# Patient Record
Sex: FEMALE | Race: WHITE | URBAN - METROPOLITAN AREA
[De-identification: names, ages, dates, MRNs, and addresses within clinical notes are randomized per-mention and may not be internally consistent; named-entity substitution may affect disease eponyms.]

---

## 2023-04-04 ENCOUNTER — HOSPITAL ENCOUNTER (EMERGENCY)
Facility: HOSPITAL | Age: 48
Discharge: HOME | End: 2023-04-04
Attending: EMERGENCY MEDICINE
Payer: COMMERCIAL

## 2023-04-04 VITALS
HEART RATE: 84 BPM | RESPIRATION RATE: 18 BRPM | DIASTOLIC BLOOD PRESSURE: 77 MMHG | SYSTOLIC BLOOD PRESSURE: 156 MMHG | TEMPERATURE: 98 F | OXYGEN SATURATION: 99 %

## 2023-04-04 DIAGNOSIS — T78.40XA ALLERGIC REACTION, INITIAL ENCOUNTER: Primary | ICD-10-CM

## 2023-04-04 PROCEDURE — 99284 EMERGENCY DEPT VISIT MOD MDM: CPT

## 2023-04-04 PROCEDURE — 96376 TX/PRO/DX INJ SAME DRUG ADON: CPT

## 2023-04-04 PROCEDURE — 99285 EMERGENCY DEPT VISIT HI MDM: CPT

## 2023-04-04 PROCEDURE — 96374 THER/PROPH/DIAG INJ IV PUSH: CPT

## 2023-04-04 PROCEDURE — 96361 HYDRATE IV INFUSION ADD-ON: CPT

## 2023-04-04 PROCEDURE — S0028 INJECTION, FAMOTIDINE, 20 MG: HCPCS | Performed by: EMERGENCY MEDICINE

## 2023-04-04 PROCEDURE — 96375 TX/PRO/DX INJ NEW DRUG ADDON: CPT

## 2023-04-04 RX ORDER — DIPHENHYDRAMINE HYDROCHLORIDE 50 MG/ML
50 INJECTION INTRAMUSCULAR; INTRAVENOUS ONCE
Status: DISCONTINUED | OUTPATIENT
Start: 2023-04-04 | End: 2023-04-04

## 2023-04-04 RX ORDER — DIPHENHYDRAMINE HYDROCHLORIDE 50 MG/ML
50 INJECTION INTRAMUSCULAR; INTRAVENOUS ONCE
Status: COMPLETED | OUTPATIENT
Start: 2023-04-04 | End: 2023-04-04

## 2023-04-04 RX ORDER — METHYLPREDNISOLONE SODIUM SUCCINATE 125 MG/2ML
125 INJECTION, POWDER, LYOPHILIZED, FOR SOLUTION INTRAMUSCULAR; INTRAVENOUS ONCE
Status: COMPLETED | OUTPATIENT
Start: 2023-04-04 | End: 2023-04-04

## 2023-04-04 RX ORDER — DIPHENHYDRAMINE HYDROCHLORIDE 50 MG/ML
25 INJECTION INTRAMUSCULAR; INTRAVENOUS ONCE
Status: COMPLETED | OUTPATIENT
Start: 2023-04-04 | End: 2023-04-04

## 2023-04-04 RX ORDER — LORAZEPAM 2 MG/ML
1 INJECTION INTRAMUSCULAR ONCE
Status: COMPLETED | OUTPATIENT
Start: 2023-04-04 | End: 2023-04-04

## 2023-04-04 RX ORDER — METHYLPREDNISOLONE SODIUM SUCCINATE 125 MG/2ML
125 INJECTION, POWDER, LYOPHILIZED, FOR SOLUTION INTRAMUSCULAR; INTRAVENOUS ONCE
Status: DISCONTINUED | OUTPATIENT
Start: 2023-04-04 | End: 2023-04-04

## 2023-04-04 RX ORDER — FAMOTIDINE 10 MG/ML
20 INJECTION, SOLUTION INTRAVENOUS ONCE
Status: DISCONTINUED | OUTPATIENT
Start: 2023-04-04 | End: 2023-04-04

## 2023-04-04 RX ORDER — FAMOTIDINE 10 MG/ML
20 INJECTION, SOLUTION INTRAVENOUS ONCE
Status: COMPLETED | OUTPATIENT
Start: 2023-04-04 | End: 2023-04-04

## 2024-04-23 ENCOUNTER — HOSPITAL ENCOUNTER (EMERGENCY)
Facility: HOSPITAL | Age: 49
Discharge: HOME | End: 2024-04-23
Attending: EMERGENCY MEDICINE
Payer: COMMERCIAL

## 2024-04-23 VITALS
SYSTOLIC BLOOD PRESSURE: 142 MMHG | HEART RATE: 106 BPM | HEIGHT: 65 IN | OXYGEN SATURATION: 98 % | DIASTOLIC BLOOD PRESSURE: 73 MMHG | WEIGHT: 247 LBS | BODY MASS INDEX: 41.15 KG/M2 | RESPIRATION RATE: 19 BRPM

## 2024-04-23 DIAGNOSIS — T78.40XA ALLERGIC REACTION, INITIAL ENCOUNTER: Primary | ICD-10-CM

## 2024-04-23 PROCEDURE — 94640 AIRWAY INHALATION TREATMENT: CPT

## 2024-04-23 PROCEDURE — 99285 EMERGENCY DEPT VISIT HI MDM: CPT

## 2024-04-23 PROCEDURE — 99284 EMERGENCY DEPT VISIT MOD MDM: CPT

## 2024-04-23 PROCEDURE — S0028 INJECTION, FAMOTIDINE, 20 MG: HCPCS | Performed by: EMERGENCY MEDICINE

## 2024-04-23 PROCEDURE — 96376 TX/PRO/DX INJ SAME DRUG ADON: CPT

## 2024-04-23 PROCEDURE — 96374 THER/PROPH/DIAG INJ IV PUSH: CPT

## 2024-04-23 PROCEDURE — 96375 TX/PRO/DX INJ NEW DRUG ADDON: CPT

## 2024-04-23 RX ORDER — DIPHENHYDRAMINE HYDROCHLORIDE 50 MG/ML
50 INJECTION INTRAMUSCULAR; INTRAVENOUS ONCE
Status: COMPLETED | OUTPATIENT
Start: 2024-04-23 | End: 2024-04-23

## 2024-04-23 RX ORDER — METHYLPREDNISOLONE SODIUM SUCCINATE 125 MG/2ML
125 INJECTION, POWDER, LYOPHILIZED, FOR SOLUTION INTRAMUSCULAR; INTRAVENOUS ONCE
Status: COMPLETED | OUTPATIENT
Start: 2024-04-23 | End: 2024-04-23

## 2024-04-23 RX ORDER — ALBUTEROL SULFATE 2.5 MG/3ML
2.5 SOLUTION RESPIRATORY (INHALATION) ONCE
Status: COMPLETED | OUTPATIENT
Start: 2024-04-23 | End: 2024-04-23

## 2024-04-23 RX ORDER — KETOROLAC TROMETHAMINE 15 MG/ML
15 INJECTION, SOLUTION INTRAMUSCULAR; INTRAVENOUS ONCE
Status: COMPLETED | OUTPATIENT
Start: 2024-04-23 | End: 2024-04-23

## 2024-04-23 RX ORDER — FAMOTIDINE 10 MG/ML
20 INJECTION, SOLUTION INTRAVENOUS ONCE
Status: COMPLETED | OUTPATIENT
Start: 2024-04-23 | End: 2024-04-23

## 2024-04-23 RX ORDER — LORAZEPAM 2 MG/ML
1 INJECTION INTRAMUSCULAR ONCE
Status: COMPLETED | OUTPATIENT
Start: 2024-04-23 | End: 2024-04-23

## 2024-04-23 NOTE — ED PROVIDER NOTES
Patient Seen in: [facility] Emergency Department      History     Chief Complaint   Patient presents with    Allergic Rxn Allergies     From infusion facility     Stated Complaint: Allergic Reaction    Subjective:   HPI    48-year-old female with an immunodeficiency syndrome received an IVIG infusion today as she usually does every 3 weeks and developed an allergic response which prompted the ED visit.  She received a total of the following medications at the infusion center prior to coming by ambulance to: Solu-Medrol 125 mg, Benadryl 200 mg, Zofran 12 mg, acetaminophen 1000 mg, Pepcid 20 mg.  Currently she states that she is mildly short of breath and coughing and may benefit from a nebulizer treatment.  She is requesting additional rounds of the Benadryl, Solu-Medrol, Pepcid and IV Ativan.  She states that after that she will likely be able to go home.  No vomiting or diarrhea.  No fever.    Objective:   No pertinent past medical history.            No pertinent past surgical history.              No pertinent social history.            Review of Systems    Positive for stated complaint: Allergic Reaction  Other systems are as noted in HPI.  Constitutional and vital signs reviewed.      All other systems reviewed and negative except as noted above.    Physical Exam     ED Triage Vitals [04/23/24 1715]   BP (!) 164/74   Pulse (!) 136   Resp 18   Temp    Temp src    SpO2 99 %   O2 Device None (Room air)       Current:/73   Pulse 106   Resp 19   Ht 165.1 cm (5' 5\")   Wt 112 kg   SpO2 98%   BMI 41.10 kg/m²         Physical Exam    General appearance: This is a middle-aged female sitting on a gurney.  Vital signs were reviewed per nurses notes.  Monitor reveals a sinus tachycardia rate of 130s.  Initial blood pressure was 164/74.  Patient is afebrile.  Pulse oximetry is 99% on room air.  HEENT: Normocephalic/atraumatic.  Anicteric sclera.  Oral mucosa is moist.  Oropharynx is normal.  Neck: No  adenopathy or thyromegaly.  Lungs are clear to auscultation.  There is a persistent cough which is dry.  Heart exam: Normal S1-S2 without extra sounds or murmurs.  Regular rate and rhythm.  Abdomen is nontender.  Extremities are atraumatic.  Skin is dry without rashes or lesions.  Neuroexam: Awake, conversive and moving all 4 extremities well.    ED Course   Labs Reviewed - No data to display          Patient was kept on a cardiac monitor.  Intravenous access was maintained.  The patient was treated with Benadryl 50 mg, Solu-Medrol 125 mg, Pepcid 20 mg and Ativan 1 mg IV push.  And nebulizer treatment with albuterol and Atrovent was ordered.  The patient was placed on observation status.    After additional antihistamines and Ativan, the patient was noted to be sitting upright on a cart in no respiratory distress and feeling significantly symptomatically improved.  She desired discharge and was discharged to home.         MDM      #1.  Allergic reaction.  2.  Immunodeficiency syndrome.  Has had other allergic responses to IVIG infusion including 1 prior episode that necessitated ED visit.  After large doses of antihistamines, steroids and Ativan she is symptomatically improved and was discharged to home.                                   Medical Decision Making      Disposition and Plan     Clinical Impression:  1. Allergic reaction, initial encounter         Disposition:  Discharge  4/23/2024  9:16 pm    Follow-up:  Lucrecia Norman  [address] [place] [id number] [phone]    Call  As needed          Medications Prescribed:  Discharge Medication List as of 4/23/2024  9:21 PM

## 2024-04-23 NOTE — ED INITIAL ASSESSMENT (HPI)
Pt came from [place] where she gets [facility] therapy (60g); pt was given 200mg benadryl, 120 solumedrol, zofram 12mg, tylenol 1.5tab, pepcid \"single dose\"; JOAO; pt long hx of allergies; pt usually has 3 minor reaction but this time she was not recovering and the facility called EMS; was at ER last night in [place].

## 2024-04-24 NOTE — ED QUICK NOTES
Bedside patient report given to SERINA Lemons. Needs another round of her medication plan; MD going to assess and then order.

## 2024-05-14 ENCOUNTER — HOSPITAL ENCOUNTER (EMERGENCY)
Facility: HOSPITAL | Age: 49
Discharge: LEFT BEFORE DISCHARGE | End: 2024-05-14
Attending: EMERGENCY MEDICINE

## 2024-05-14 VITALS
RESPIRATION RATE: 15 BRPM | WEIGHT: 246.94 LBS | DIASTOLIC BLOOD PRESSURE: 72 MMHG | SYSTOLIC BLOOD PRESSURE: 151 MMHG | OXYGEN SATURATION: 96 % | HEART RATE: 119 BPM | BODY MASS INDEX: 41 KG/M2

## 2024-05-14 DIAGNOSIS — T78.40XA ALLERGIC REACTION, INITIAL ENCOUNTER: Primary | ICD-10-CM

## 2024-05-14 PROCEDURE — 96376 TX/PRO/DX INJ SAME DRUG ADON: CPT

## 2024-05-14 PROCEDURE — 99285 EMERGENCY DEPT VISIT HI MDM: CPT

## 2024-05-14 PROCEDURE — 96374 THER/PROPH/DIAG INJ IV PUSH: CPT

## 2024-05-14 PROCEDURE — 99284 EMERGENCY DEPT VISIT MOD MDM: CPT

## 2024-05-14 PROCEDURE — 96372 THER/PROPH/DIAG INJ SC/IM: CPT

## 2024-05-14 PROCEDURE — 96361 HYDRATE IV INFUSION ADD-ON: CPT

## 2024-05-14 PROCEDURE — S0028 INJECTION, FAMOTIDINE, 20 MG: HCPCS | Performed by: EMERGENCY MEDICINE

## 2024-05-14 PROCEDURE — 96375 TX/PRO/DX INJ NEW DRUG ADDON: CPT

## 2024-05-14 RX ORDER — ONDANSETRON 2 MG/ML
4 INJECTION INTRAMUSCULAR; INTRAVENOUS ONCE
Status: COMPLETED | OUTPATIENT
Start: 2024-05-14 | End: 2024-05-14

## 2024-05-14 RX ORDER — DIPHENHYDRAMINE HYDROCHLORIDE 50 MG/ML
50 INJECTION INTRAMUSCULAR; INTRAVENOUS ONCE
Status: DISCONTINUED | OUTPATIENT
Start: 2024-05-14 | End: 2024-05-14

## 2024-05-14 RX ORDER — LORAZEPAM 2 MG/ML
1 INJECTION INTRAMUSCULAR ONCE
Status: COMPLETED | OUTPATIENT
Start: 2024-05-14 | End: 2024-05-14

## 2024-05-14 RX ORDER — FAMOTIDINE 10 MG/ML
20 INJECTION, SOLUTION INTRAVENOUS ONCE
Status: COMPLETED | OUTPATIENT
Start: 2024-05-14 | End: 2024-05-14

## 2024-05-14 RX ORDER — METHYLPREDNISOLONE SODIUM SUCCINATE 125 MG/2ML
125 INJECTION, POWDER, LYOPHILIZED, FOR SOLUTION INTRAMUSCULAR; INTRAVENOUS ONCE
Status: COMPLETED | OUTPATIENT
Start: 2024-05-14 | End: 2024-05-14

## 2024-05-14 NOTE — ED PROVIDER NOTES
Patient Seen in: [facility] Emergency Department      History     Chief Complaint   Patient presents with    Allergic Rxn Allergies     Stated Complaint: allergic reaction to infusion    Subjective:   48-year-old female, receives every 3-week IVIG infusion, presents with possible allergic reaction.  States she was seen here last month for same.  States she required high doses of steroids, high-dose antihistamines, Ativan and Zofran and then goes away.  Standing up and today after infusion, sent here from infusion center with prescription from that physician there.  Upon arrival she is up and ambulatory.  She is coughing.  No signs of angioedema or anaphylaxis            Objective:   Past Medical History:    Allergies    Asthma (HCC)    Breast cancer (HCC)    Mast cell activation (HCC)              History reviewed. No pertinent surgical history.             Social History     Socioeconomic History    Marital status:    Tobacco Use    Smoking status: Never    Smokeless tobacco: Never   Vaping Use    Vaping status: Never Used   Substance and Sexual Activity    Alcohol use: Never    Drug use: Never              Review of Systems   Constitutional:  Negative for fever.   HENT:  Negative for sore throat.    Respiratory:  Positive for cough.    Gastrointestinal:  Positive for nausea. Negative for vomiting.   Skin:  Negative for rash.   All other systems reviewed and are negative.      Positive for stated complaint: allergic reaction to infusion  Other systems are as noted in HPI.  Constitutional and vital signs reviewed.      All other systems reviewed and negative except as noted above.    Physical Exam     ED Triage Vitals [05/14/24 1715]   BP (!) 153/92   Pulse (!) 136   Resp 24   Temp    Temp src    SpO2 99 %   O2 Device None (Room air)       Current Vitals:   Vital Signs  BP: 151/72  Pulse: 119  Resp: 15  MAP (mmHg): 92    Oxygen Therapy  SpO2: 96 %  O2 Device: None (Room air)            Physical  Exam  Vitals and nursing note reviewed.   Constitutional:       Appearance: She is not toxic-appearing or diaphoretic.   HENT:      Head: Normocephalic.      Nose: Nose normal.      Mouth/Throat:      Mouth: Mucous membranes are moist.      Pharynx: Oropharynx is clear. No oropharyngeal exudate or posterior oropharyngeal erythema.   Cardiovascular:      Rate and Rhythm: Regular rhythm. Tachycardia present.      Pulses: Normal pulses.      Heart sounds: Normal heart sounds.   Pulmonary:      Effort: Pulmonary effort is normal. No respiratory distress.      Breath sounds: Normal breath sounds. No stridor. No wheezing, rhonchi or rales.   Abdominal:      Palpations: Abdomen is soft.   Musculoskeletal:         General: Normal range of motion.      Cervical back: Normal range of motion and neck supple.   Skin:     General: Skin is warm and dry.      Findings: No erythema or rash.   Neurological:      General: No focal deficit present.      Mental Status: She is alert.               ED Course     Labs Reviewed   RAINBOW DRAW LAVENDER   RAINBOW DRAW LIGHT GREEN                      MDM        Differential diagnosis includes, but is not limited to, allergic reaction, somatoform disorder, angioedema, anaphylaxis, shock    Family at bedside helpful to provide information on the history of presenting illness    External chart review demonstrates outpatient visit with hematology oncology in March of this year    48-year-old female presents from infusion center for allergic reaction.  Patient has episodes of coughing fits.  At times she was intermittently wheezing.  States she requires high doses of steroids, high doses of antihistamines, high doses of benzodiazepines and antiemetics.  I did review her recent ER visit and some limited external records.  Did discuss risk benefits alternatives.  After several hours of observation here she stated she started get her cough and wheeze again.  She is not wheezing at this time on  clinical examination.  She still is tachycardic but had received an EpiPen throughout her stay at her request as well as more Ativan.  I did explain that we should admit her for observation, further evaluation as I am not comfortable giving her more steroids, more antihistamines, more Zofran, more Ativan etc.  She was not on board with that.  States she wants to go home.  States she has 3 EpiPen's at home that she carries at all times.  She would like a prescription for prednisone.  She has Benadryl and Pepcid at home.  I tried to convince her to stay to the best my ability and explained angioedema, anaphylaxis, allergic reaction etc.  She declines.  Family also try to get her to stay and she refuses.  She has capacity to make her own decisions.  AMA form was signed.  She understands welcome back at any time for reason for reassessment.      CRITICAL CARE:  A total of 80 minutes of critical care time (exclusive of billable procedures) was administered to manage the patient's respiratory instability and cardiovascular instability due to her persistent tachycardia, complaints of \"anaphylaxis.\"  Reaction to her infusion.  Monitoring telemetry.  Administration of intramuscular epinephrine, steroids, Benadryl, Pepcid, IV fluids, Ativan.  Frequent assessments, reassessments and evaluations.  Question with patient and family, chart review, documentation.  This involved direct patient intervention, complex decision making, and/or extensive discussions with the patient, family, and clinical staff.                             Medical Decision Making      Disposition and Plan     Clinical Impression:  1. Allergic reaction, initial encounter         Disposition:  [place]  5/14/2024  8:09 pm    Follow-up:  Lucrecia Norman  [address]  [facility] [id number] [phone]    Follow up      [facility]  [address] [facility] [address]  [phone]  Follow up  As  needed          Medications Prescribed:  Discharge Medication List as of 5/14/2024  8:10 PM        START taking these medications    Details   predniSONE 5 MG/ML Oral Conc Take 12 mL (60 mg total) by mouth daily for 4 days., Normal, Disp-48 mL, R-0

## 2024-05-14 NOTE — ED INITIAL ASSESSMENT (HPI)
Patient from [facility]. Sent for suspected allergic reaction to [facility] infusion for immunodeficiency. Received Tylenol 650mg, Zofran 4mg, Benadryl 100mg, Solu-Medrol 80mg and Pepcid 20mg within the last hour. Seen on Sunday for same. Refused PIV from EMS.

## 2024-05-14 NOTE — ED QUICK NOTES
Patient states she feels like her allergic reaction is coming back. MD aware. Patient placed back on monitor

## 2024-05-15 NOTE — ED QUICK NOTES
Patient states she feels like her \"reaction is coming back, I'm having a cough and a runny nose and I'm sweating so I need more benadryl and more ativan\". Pt maintaining airway and breathing easy, no other signs of allergic reaction noted at this time. MD aware.

## 2025-05-13 ENCOUNTER — HOSPITAL ENCOUNTER (EMERGENCY)
Facility: HOSPITAL | Age: 50
Discharge: HOME | End: 2025-05-13
Attending: EMERGENCY MEDICINE
Payer: COMMERCIAL

## 2025-05-13 VITALS
TEMPERATURE: 99 F | DIASTOLIC BLOOD PRESSURE: 83 MMHG | OXYGEN SATURATION: 100 % | SYSTOLIC BLOOD PRESSURE: 143 MMHG | HEART RATE: 79 BPM | RESPIRATION RATE: 15 BRPM

## 2025-05-13 DIAGNOSIS — T78.40XA ALLERGIC REACTION, INITIAL ENCOUNTER: Primary | ICD-10-CM

## 2025-05-13 PROCEDURE — 96374 THER/PROPH/DIAG INJ IV PUSH: CPT

## 2025-05-13 PROCEDURE — 96375 TX/PRO/DX INJ NEW DRUG ADDON: CPT

## 2025-05-13 PROCEDURE — 93010 ELECTROCARDIOGRAM REPORT: CPT

## 2025-05-13 PROCEDURE — 93005 ELECTROCARDIOGRAM TRACING: CPT

## 2025-05-13 PROCEDURE — 99285 EMERGENCY DEPT VISIT HI MDM: CPT

## 2025-05-13 PROCEDURE — 96361 HYDRATE IV INFUSION ADD-ON: CPT

## 2025-05-13 PROCEDURE — 96376 TX/PRO/DX INJ SAME DRUG ADON: CPT

## 2025-05-13 RX ORDER — LORAZEPAM 2 MG/ML
1 INJECTION INTRAMUSCULAR ONCE
Status: COMPLETED | OUTPATIENT
Start: 2025-05-13 | End: 2025-05-13

## 2025-05-13 RX ORDER — ONDANSETRON 2 MG/ML
4 INJECTION INTRAMUSCULAR; INTRAVENOUS ONCE
Status: COMPLETED | OUTPATIENT
Start: 2025-05-13 | End: 2025-05-13

## 2025-05-13 RX ORDER — METHYLPREDNISOLONE SODIUM SUCCINATE 125 MG/2ML
INJECTION INTRAMUSCULAR; INTRAVENOUS
Status: COMPLETED
Start: 2025-05-13 | End: 2025-05-13

## 2025-05-13 RX ORDER — FAMOTIDINE 10 MG/ML
INJECTION, SOLUTION INTRAVENOUS
Status: COMPLETED
Start: 2025-05-13 | End: 2025-05-13

## 2025-05-13 RX ORDER — DIPHENHYDRAMINE HYDROCHLORIDE 50 MG/ML
INJECTION, SOLUTION INTRAMUSCULAR; INTRAVENOUS
Status: COMPLETED
Start: 2025-05-13 | End: 2025-05-13

## 2025-05-13 RX ORDER — PREDNISOLONE SODIUM PHOSPHATE 15 MG/5ML
40 SOLUTION ORAL DAILY
Qty: 65 ML | Refills: 0 | Status: SHIPPED | OUTPATIENT
Start: 2025-05-13 | End: 2025-05-18

## 2025-05-13 RX ORDER — FAMOTIDINE 10 MG/ML
20 INJECTION, SOLUTION INTRAVENOUS ONCE
Status: COMPLETED | OUTPATIENT
Start: 2025-05-13 | End: 2025-05-13

## 2025-05-13 RX ORDER — DIPHENHYDRAMINE HYDROCHLORIDE 50 MG/ML
50 INJECTION, SOLUTION INTRAMUSCULAR; INTRAVENOUS ONCE
Status: COMPLETED | OUTPATIENT
Start: 2025-05-13 | End: 2025-05-13

## 2025-05-13 RX ORDER — METHYLPREDNISOLONE SODIUM SUCCINATE 125 MG/2ML
125 INJECTION INTRAMUSCULAR; INTRAVENOUS ONCE
Status: COMPLETED | OUTPATIENT
Start: 2025-05-13 | End: 2025-05-13

## 2025-05-13 NOTE — ED PROVIDER NOTES
Patient Seen in: [facility] Emergency Department      History     Chief Complaint   Patient presents with    Allergic Rxn Allergies     Stated Complaint:     Subjective:   HPI  History of Present Illness            49-year-old female presents for evaluation of allergic reaction.  Patient receives IVIG every 3 weeks for immunodeficiency disease.  She received her usual infusion this morning and that had a typical migraine type headache afterwards for which she took Nurtec.  Shortly thereafter she developed cough, itching and throat swelling.  She was given epi injection at the clinic and transferred here.  She brings with her a list of medications that she typically receives when she has an allergic reaction of similar character      Objective:     Past Medical History:    Allergies    Asthma (HCC)    Breast cancer (HCC)    Mast cell activation (HCC)              History reviewed. No pertinent surgical history.             Social History     Socioeconomic History    Marital status:    Tobacco Use    Smoking status: Never    Smokeless tobacco: Never   Vaping Use    Vaping status: Never Used   Substance and Sexual Activity    Alcohol use: Never    Drug use: Never                                Physical Exam     ED Triage Vitals [05/13/25 1644]   BP (!) 152/120   Pulse (!) 14   Resp 23   Temp 98.8 °F (37.1 °C)   Temp src Oral   SpO2 100 %   O2 Device Nasal cannula       Current Vitals:   Vital Signs  BP: (!) 148/92  Pulse: 85  Resp: 21  Temp: 98.8 °F (37.1 °C)  Temp src: Oral  MAP (mmHg): (!) 107    Oxygen Therapy  SpO2: 100 %  O2 Device: None (Room air)  O2 Flow Rate (L/min): 3 L/min          Physical Exam       General: Alert, oriented, no apparent distress, coughing, no stridor  HEENT:  Pupils equal reactive.  Extraocular motions intact. MMM.  No oral pharyngeal swelling  Neck: Supple  Lungs: Clear to auscultation bilaterally.  Heart: Regular rate and rhythm.  Abdomen: Soft, nontender.   Skin: No rash.   No edema.  Neurologic: No focal neurologic deficits.  Normal speech pattern  Musculoskeletal: No tenderness or deformity noted.  Full range of motion throughout.        ED Course     Labs Reviewed - No data to display    EKG    Rate, intervals and axes as noted on EKG Report.  Rate: 130  Rhythm: Sinus Rhythm  Reading: no ST elevation or depression              Results                              MDM      Differential diagnosis includes angioedema, allergic reaction, hives    Patient brought in a protocol for her symptom treatment.  Solu-Medrol, Benadryl, Zofran and Ativan given repeatedly over 4-hour period.    Symptoms improved.    Per family report, this timeframe and medication protocol almost universally improves her allergy symptoms    Medical Decision Making  Amount and/or Complexity of Data Reviewed  Independent Historian: caregiver     Details: Son at bedside    Patient will be discharged with a prescription for prednisone.  Tells me she needs it in liquid form.  She has multiple EpiPen's at home.    Disposition and Plan     Clinical Impression:  1. Allergic reaction, initial encounter         Disposition:  There is no disposition on file for this visit.  There is no disposition time on file for this visit.    Follow-up:  Lucrecia Norman  [address]  [facility] [id number] [phone]    Call            Medications Prescribed:  Current Discharge Medication List        START taking these medications    Details   prednisoLONE 3 MG/ML Oral Solution Take 13 mL (39 mg total) by mouth daily for 5 days.  Qty: 65 mL, Refills: 0                   Supplementary Documentation:

## 2025-05-13 NOTE — ED INITIAL ASSESSMENT (HPI)
Pt was getting CVID treatment today and started having an allergic reaction. Treatment was IVIG and she always has a reaction. Was given eip at clinic

## 2025-05-14 LAB
ATRIAL RATE: 130 BPM
P AXIS: 55 DEGREES
P-R INTERVAL: 146 MS
Q-T INTERVAL: 290 MS
QRS DURATION: 68 MS
QTC CALCULATION (BEZET): 426 MS
R AXIS: 8 DEGREES
T AXIS: 61 DEGREES
VENTRICULAR RATE: 130 BPM